# Patient Record
Sex: MALE | Race: WHITE | NOT HISPANIC OR LATINO | Employment: FULL TIME | URBAN - NONMETROPOLITAN AREA
[De-identification: names, ages, dates, MRNs, and addresses within clinical notes are randomized per-mention and may not be internally consistent; named-entity substitution may affect disease eponyms.]

---

## 2023-07-10 PROBLEM — N62 GYNECOMASTIA, MALE: Status: ACTIVE | Noted: 2023-07-10

## 2023-07-10 PROBLEM — N64.52 NIPPLE DISCHARGE IN MALE: Status: ACTIVE | Noted: 2023-07-10

## 2023-07-10 PROBLEM — E66.9 OBESITY (BMI 35.0-39.9 WITHOUT COMORBIDITY): Status: ACTIVE | Noted: 2023-07-10

## 2023-07-11 ENCOUNTER — OFFICE VISIT (OUTPATIENT)
Dept: PRIMARY CARE | Facility: CLINIC | Age: 28
End: 2023-07-11
Payer: COMMERCIAL

## 2023-07-11 VITALS
HEIGHT: 72 IN | SYSTOLIC BLOOD PRESSURE: 120 MMHG | OXYGEN SATURATION: 98 % | BODY MASS INDEX: 37.22 KG/M2 | DIASTOLIC BLOOD PRESSURE: 80 MMHG | WEIGHT: 274.8 LBS | HEART RATE: 101 BPM

## 2023-07-11 DIAGNOSIS — J30.2 SEASONAL ALLERGIC RHINITIS, UNSPECIFIED TRIGGER: Primary | ICD-10-CM

## 2023-07-11 DIAGNOSIS — J01.00 ACUTE NON-RECURRENT MAXILLARY SINUSITIS: ICD-10-CM

## 2023-07-11 PROCEDURE — 1036F TOBACCO NON-USER: CPT | Performed by: FAMILY MEDICINE

## 2023-07-11 PROCEDURE — 99213 OFFICE O/P EST LOW 20 MIN: CPT | Performed by: FAMILY MEDICINE

## 2023-07-11 RX ORDER — FLUTICASONE PROPIONATE 50 MCG
2 SPRAY, SUSPENSION (ML) NASAL DAILY
Qty: 16 G | Refills: 11 | Status: SHIPPED | OUTPATIENT
Start: 2023-07-11 | End: 2024-07-10

## 2023-07-11 RX ORDER — PREDNISONE 20 MG/1
40 TABLET ORAL DAILY
Qty: 10 TABLET | Refills: 0 | Status: SHIPPED | OUTPATIENT
Start: 2023-07-11 | End: 2023-07-16

## 2023-07-11 RX ORDER — AZITHROMYCIN 250 MG/1
TABLET, FILM COATED ORAL
Qty: 6 TABLET | Refills: 0 | Status: SHIPPED | OUTPATIENT
Start: 2023-07-11 | End: 2023-07-16

## 2023-07-11 RX ORDER — LORATADINE 10 MG/1
10 TABLET ORAL DAILY
Qty: 30 TABLET | Refills: 11 | Status: SHIPPED | OUTPATIENT
Start: 2023-07-11 | End: 2024-07-10

## 2023-07-11 ASSESSMENT — ENCOUNTER SYMPTOMS
CONSTIPATION: 0
RHINORRHEA: 1
CHEST TIGHTNESS: 0
FEVER: 0
FATIGUE: 0
SHORTNESS OF BREATH: 0
CHILLS: 0
FACIAL SWELLING: 0
VOMITING: 0
SINUS PAIN: 1
ABDOMINAL PAIN: 0
SINUS PRESSURE: 1
ACTIVITY CHANGE: 0
COUGH: 0
APPETITE CHANGE: 0
SORE THROAT: 0
DIARRHEA: 0
NAUSEA: 0
PALPITATIONS: 0

## 2023-07-11 NOTE — PROGRESS NOTES
Subjective   Patient ID: Aldair Case is a 27 y.o. male who presents for Dizziness.    HPI   2 weeks comes and goes, some tenderness under ears, + nasal congestion and stuffiness, PND, has had treatment in December, + vertigo (worse with movement), no ear pressure or pain, no hearing issues  No OTC    Review of Systems   Constitutional:  Negative for activity change, appetite change, chills, fatigue and fever.   HENT:  Positive for congestion, postnasal drip, rhinorrhea, sinus pressure, sinus pain and sneezing. Negative for ear discharge, ear pain, facial swelling, hearing loss, nosebleeds and sore throat.    Respiratory:  Negative for cough, chest tightness and shortness of breath.    Cardiovascular:  Negative for chest pain, palpitations and leg swelling.   Gastrointestinal:  Negative for abdominal pain, constipation, diarrhea, nausea and vomiting.       Objective   /80   Pulse 101   Ht 1.829 m (6')   Wt 125 kg (274 lb 12.8 oz)   SpO2 98%   BMI 37.27 kg/m²     Physical Exam  Vitals and nursing note reviewed.   Constitutional:       Appearance: Normal appearance.   HENT:      Head: Normocephalic and atraumatic.      Right Ear: Tympanic membrane, ear canal and external ear normal.      Left Ear: Tympanic membrane, ear canal and external ear normal.      Nose: Congestion and rhinorrhea present.      Mouth/Throat:      Mouth: Mucous membranes are moist.      Pharynx: Oropharynx is clear.   Cardiovascular:      Rate and Rhythm: Normal rate and regular rhythm.      Pulses: Normal pulses.      Heart sounds: Normal heart sounds.   Pulmonary:      Effort: Pulmonary effort is normal.      Breath sounds: Normal breath sounds.   Neurological:      Mental Status: He is alert.   Psychiatric:         Mood and Affect: Mood normal.         Behavior: Behavior normal.         Assessment/Plan   Problem List Items Addressed This Visit    None  Visit Diagnoses       Seasonal allergic rhinitis, unspecified trigger    -   Primary    Prednisone 40 mg a day for 5 days, loratadine 10 mg once a day and fluticasone nose spray at night.    Relevant Medications    predniSONE (Deltasone) 20 mg tablet    azithromycin (Zithromax) 250 mg tablet    loratadine (Claritin) 10 mg tablet    fluticasone (Flonase) 50 mcg/actuation nasal spray    Acute non-recurrent maxillary sinusitis        Treat allergies, prescribed Zithromax for the next 5 days.    Relevant Medications    predniSONE (Deltasone) 20 mg tablet    azithromycin (Zithromax) 250 mg tablet    loratadine (Claritin) 10 mg tablet    fluticasone (Flonase) 50 mcg/actuation nasal spray

## 2024-02-23 ENCOUNTER — OFFICE VISIT (OUTPATIENT)
Dept: PRIMARY CARE | Facility: CLINIC | Age: 29
End: 2024-02-23
Payer: COMMERCIAL

## 2024-02-23 VITALS
HEIGHT: 72 IN | DIASTOLIC BLOOD PRESSURE: 100 MMHG | OXYGEN SATURATION: 96 % | HEART RATE: 114 BPM | WEIGHT: 273.8 LBS | BODY MASS INDEX: 37.08 KG/M2 | SYSTOLIC BLOOD PRESSURE: 140 MMHG

## 2024-02-23 DIAGNOSIS — K42.9 UMBILICAL HERNIA WITHOUT OBSTRUCTION AND WITHOUT GANGRENE: Primary | ICD-10-CM

## 2024-02-23 PROCEDURE — 99213 OFFICE O/P EST LOW 20 MIN: CPT | Performed by: FAMILY MEDICINE

## 2024-02-23 PROCEDURE — 1036F TOBACCO NON-USER: CPT | Performed by: FAMILY MEDICINE

## 2024-02-23 ASSESSMENT — ENCOUNTER SYMPTOMS
FATIGUE: 0
ABDOMINAL PAIN: 1
BELCHING: 0
ARTHRALGIAS: 0
CHEST TIGHTNESS: 0
FEVER: 0
ACTIVITY CHANGE: 0
DYSURIA: 0
PALPITATIONS: 0
WEIGHT LOSS: 0
HEMATURIA: 0
COUGH: 0
APPETITE CHANGE: 0
VOMITING: 0
DIARRHEA: 0
FLATUS: 0
HEMATOCHEZIA: 0
NAUSEA: 0
MYALGIAS: 0
SHORTNESS OF BREATH: 0
HEADACHES: 0
FREQUENCY: 0
CONSTIPATION: 0
ANOREXIA: 0

## 2024-02-23 NOTE — PROGRESS NOTES
Subjective   Patient ID: Aldair Case is a 28 y.o. male who presents for Possible ABD Hernia.    Abdominal Pain  This is a recurrent problem. The current episode started more than 1 year ago. The onset quality is undetermined. The problem occurs intermittently. The most recent episode lasted 1 days. The problem has been unchanged. The pain is located in the periumbilical region. The pain is at a severity of 1/10. The quality of the pain is dull. The abdominal pain radiates to the periumbilical region. Pertinent negatives include no anorexia, arthralgias, belching, constipation, diarrhea, dysuria, fever, flatus, frequency, headaches, hematochezia, hematuria, melena, myalgias, nausea, vomiting or weight loss. The pain is aggravated by movement. The pain is relieved by Being still.      Tenderness for 3 years around umbilicus, pulling sensation, no n/v, no bloating or toilet issues    Review of Systems   Constitutional:  Negative for activity change, appetite change, fatigue, fever and weight loss.   Respiratory:  Negative for cough, chest tightness and shortness of breath.    Cardiovascular:  Negative for chest pain, palpitations and leg swelling.   Gastrointestinal:  Positive for abdominal pain. Negative for anorexia, constipation, diarrhea, flatus, hematochezia, melena, nausea and vomiting.   Genitourinary:  Negative for dysuria, frequency and hematuria.   Musculoskeletal:  Negative for arthralgias and myalgias.   Neurological:  Negative for headaches.       Objective   BP (!) 140/100   Pulse (!) 114   Ht 1.829 m (6')   Wt 124 kg (273 lb 12.8 oz)   SpO2 96%   BMI 37.13 kg/m²     Physical Exam  Vitals and nursing note reviewed.   Constitutional:       Appearance: Normal appearance. He is normal weight.   Abdominal:      General: Abdomen is flat. Bowel sounds are normal. There is no distension.      Palpations: Abdomen is soft. There is no mass.      Tenderness: There is abdominal tenderness. There is no  guarding.      Hernia: A hernia is present.      Comments: Small reducible very tender umbilical hernia palpated.   Neurological:      Mental Status: He is alert.         Assessment/Plan   Problem List Items Addressed This Visit    None  Visit Diagnoses         Codes    Umbilical hernia without obstruction and without gangrene    -  Primary K42.9    Refer to general surgery for evaluation.     Relevant Orders    Referral to General Surgery

## 2024-03-04 ENCOUNTER — PREP FOR PROCEDURE (OUTPATIENT)
Dept: SURGERY | Facility: CLINIC | Age: 29
End: 2024-03-04

## 2024-03-04 ENCOUNTER — OFFICE VISIT (OUTPATIENT)
Dept: SURGERY | Facility: CLINIC | Age: 29
End: 2024-03-04
Payer: COMMERCIAL

## 2024-03-04 VITALS
HEART RATE: 88 BPM | DIASTOLIC BLOOD PRESSURE: 92 MMHG | WEIGHT: 280 LBS | HEIGHT: 73 IN | BODY MASS INDEX: 37.11 KG/M2 | SYSTOLIC BLOOD PRESSURE: 138 MMHG

## 2024-03-04 DIAGNOSIS — K42.9 UMBILICAL HERNIA WITHOUT OBSTRUCTION AND WITHOUT GANGRENE: ICD-10-CM

## 2024-03-04 DIAGNOSIS — K42.9 UMBILICAL HERNIA WITHOUT OBSTRUCTION AND WITHOUT GANGRENE: Primary | ICD-10-CM

## 2024-03-04 PROCEDURE — 99203 OFFICE O/P NEW LOW 30 MIN: CPT | Performed by: SURGERY

## 2024-03-04 PROCEDURE — 1036F TOBACCO NON-USER: CPT | Performed by: SURGERY

## 2024-03-04 NOTE — PROGRESS NOTES
General Surgery Consultation    Patient: Aldair Case  : 1995  MRN: [id number]  Date of Consultation: 24    Referring Primary Care Provider: Rogers Ernandez MD    Chief Complaint: Discomfort at umbilicus    History of Present Illness: Aldair Case is a 28 y.o. old male seen at the request of Dr. Ernandez for evaluation of an umbilical hernia.  About 2 years ago he was working on a deck when he had acute onset of pain at the umbilicus with some vomiting.  He described it like a pulling sensation.  That acute episode resolved.  He has a small bulge at the umbilicus.  Over the past 6 months, anytime he tenses his abdominal muscles he gets a pulling sensation that is uncomfortable for him.  He denies any subsequent obstructive symptoms.  He has no previous abdominal surgery.  He is not a smoker.  He is not diabetic.  His BMI is 36.7.    Medical History:  Eczema  Obesity, BMI 36.7    Surgical History:  No previous abdominal surgery  Surgical removal of a birthmark    Home Medications:  Prior to Admission medications    Medication Sig Start Date End Date Taking? Authorizing Provider   fluticasone (Flonase) 50 mcg/actuation nasal spray Administer 2 sprays into each nostril once daily. Shake gently. Before first use, prime pump. After use, clean tip and replace cap. 7/11/23 7/10/24  Rogers Ernandez MD   loratadine (Claritin) 10 mg tablet Take 1 tablet (10 mg) by mouth once daily. 7/11/23 7/10/24  Rogers Ernandez MD       Allergies:  is allergic to cefaclor and penicillin.    Family History:   Mother with hypothyroidism.    Father with diabetes, hypertension, diabetes.  Brother with Crohn's disease.  Paternal grandfather with colon cancer.    Social History:  .  Non-smoker.  No alcohol or drug use.    ROS:  Constitutional:  no fever, sweats, and chills  Cardiovascular: No chest pain  Respiratory: No cough or shortness of breath  Gastrointestinal: + Small bulge at umbilicus, discomfort in umbilicus  "when tensing abdominal muscles.  No change in bowel habits or blood in the stool.  Genitourinary: no dysuria  Musculoskeletal: no weakness or swelling  Integumentary: no rashes  Neurological: no confusion  Endocrine: no heat or cold intolerance  Heme/Lymph: no easy bruising or bleeding    Objective:  BP (!) 138/92   Pulse 88   Ht 1.86 m (6' 1.23\")   Wt 127 kg (280 lb)   BMI 36.71 kg/m²     Physical Exam:  Constitutional: No acute distress, conversant, pleasant  Neurologic: alert and oriented  Psych: appropriate affect  Ears, Nose, Mouth and Throat: mucus membranes moist  Pulmonary: No labored breathing  Cardiovascular: Regular rate and rhythm  Abdomen: soft, non-distended, BMI 36.7, no surgical scars, small reducible 1 cm umbilical hernia, tender on reduction  Musculoskeletal: Moves all extremities, no edema  Skin: no jaundice    Labs/Imaging:   No pertinent labs or imaging available for review.    Assessment and Plan: Aldair Case is a 28 y.o. old male with a small reducible umbilical hernia.  Risks, benefits and alternatives of open umbilical hernia repair were discussed with the patient.  This included risk of bleeding, infection, viscous injury, and hernia recurrence. This will not require the use of mesh. The patient was agreeable to surgery and is scheduled for open repair of umbilical hernia under general anesthesia on 3/27/24.    Reyna Morris MD  3/4/2024    "

## 2024-03-04 NOTE — H&P (VIEW-ONLY)
General Surgery Consultation    Patient: Aldair Case  : 1995  MRN: [id number]  Date of Consultation: 24    Referring Primary Care Provider: Rogers Ernandez MD    Chief Complaint: Discomfort at umbilicus    History of Present Illness: Aldair Case is a 28 y.o. old male seen at the request of Dr. Ernandez for evaluation of an umbilical hernia.  About 2 years ago he was working on a deck when he had acute onset of pain at the umbilicus with some vomiting.  He described it like a pulling sensation.  That acute episode resolved.  He has a small bulge at the umbilicus.  Over the past 6 months, anytime he tenses his abdominal muscles he gets a pulling sensation that is uncomfortable for him.  He denies any subsequent obstructive symptoms.  He has no previous abdominal surgery.  He is not a smoker.  He is not diabetic.  His BMI is 36.7.    Medical History:  Eczema  Obesity, BMI 36.7    Surgical History:  No previous abdominal surgery  Surgical removal of a birthmark    Home Medications:  Prior to Admission medications    Medication Sig Start Date End Date Taking? Authorizing Provider   fluticasone (Flonase) 50 mcg/actuation nasal spray Administer 2 sprays into each nostril once daily. Shake gently. Before first use, prime pump. After use, clean tip and replace cap. 7/11/23 7/10/24  Rogers Ernandez MD   loratadine (Claritin) 10 mg tablet Take 1 tablet (10 mg) by mouth once daily. 7/11/23 7/10/24  Rogers Ernandez MD       Allergies:  is allergic to cefaclor and penicillin.    Family History:   Mother with hypothyroidism.    Father with diabetes, hypertension, diabetes.  Brother with Crohn's disease.  Paternal grandfather with colon cancer.    Social History:  .  Non-smoker.  No alcohol or drug use.    ROS:  Constitutional:  no fever, sweats, and chills  Cardiovascular: No chest pain  Respiratory: No cough or shortness of breath  Gastrointestinal: + Small bulge at umbilicus, discomfort in umbilicus  "when tensing abdominal muscles.  No change in bowel habits or blood in the stool.  Genitourinary: no dysuria  Musculoskeletal: no weakness or swelling  Integumentary: no rashes  Neurological: no confusion  Endocrine: no heat or cold intolerance  Heme/Lymph: no easy bruising or bleeding    Objective:  BP (!) 138/92   Pulse 88   Ht 1.86 m (6' 1.23\")   Wt 127 kg (280 lb)   BMI 36.71 kg/m²     Physical Exam:  Constitutional: No acute distress, conversant, pleasant  Neurologic: alert and oriented  Psych: appropriate affect  Ears, Nose, Mouth and Throat: mucus membranes moist  Pulmonary: No labored breathing  Cardiovascular: Regular rate and rhythm  Abdomen: soft, non-distended, BMI 36.7, no surgical scars, small reducible 1 cm umbilical hernia, tender on reduction  Musculoskeletal: Moves all extremities, no edema  Skin: no jaundice    Labs/Imaging:   No pertinent labs or imaging available for review.    Assessment and Plan: Aldair Case is a 28 y.o. old male with a small reducible umbilical hernia.  Risks, benefits and alternatives of open umbilical hernia repair were discussed with the patient.  This included risk of bleeding, infection, viscous injury, and hernia recurrence. This will not require the use of mesh. The patient was agreeable to surgery and is scheduled for open repair of umbilical hernia under general anesthesia on 3/27/24.    Reyna Morris MD  3/4/2024    "

## 2024-03-04 NOTE — LETTER
2024     Rogers Ernandez MD  [address] [facility] [address]    Patient: Aldair Case   YOB: 1995   Date of Visit: 3/4/2024       Dear Dr. Rogers Ernandez MD:    Thank you for referring Aldair Case to me for evaluation. Below are my notes for this consultation.  If you have questions, please do not hesitate to call me. I look forward to following your patient along with you.       Sincerely,     Reyna Morris MD      CC: No Recipients  ______________________________________________________________________________________    General Surgery Consultation    Patient: Aldair Case  : 1995  MRN: [id number]  Date of Consultation: 24    Referring Primary Care Provider: Rogers Ernandez MD    Chief Complaint: Discomfort at umbilicus    History of Present Illness: Aldair Case is a 28 y.o. old male seen at the request of Dr. Ernandez for evaluation of an umbilical hernia.  About 2 years ago he was working on a deck when he had acute onset of pain at the umbilicus with some vomiting.  He described it like a pulling sensation.  That acute episode resolved.  He has a small bulge at the umbilicus.  Over the past 6 months, anytime he tenses his abdominal muscles he gets a pulling sensation that is uncomfortable for him.  He denies any subsequent obstructive symptoms.  He has no previous abdominal surgery.  He is not a smoker.  He is not diabetic.  His BMI is 36.7.    Medical History:  Eczema  Obesity, BMI 36.7    Surgical History:  No previous abdominal surgery  Surgical removal of a birthmark    Home Medications:  Prior to Admission medications    Medication Sig Start Date End Date Taking? Authorizing Provider   fluticasone (Flonase) 50 mcg/actuation nasal spray Administer 2 sprays into each nostril once daily. Shake gently. Before first use, prime pump. After use, clean tip and replace cap. 7/11/23 7/10/24  Rogers Ernandez MD   loratadine (Claritin) 10 mg tablet Take 1 tablet (10  "mg) by mouth once daily. 7/11/23 7/10/24  Rogers Ernandez MD       Allergies:  is allergic to cefaclor and penicillin.    Family History:   Mother with hypothyroidism.    Father with diabetes, hypertension, diabetes.  Brother with Crohn's disease.  Paternal grandfather with colon cancer.    Social History:  .  Non-smoker.  No alcohol or drug use.    ROS:  Constitutional:  no fever, sweats, and chills  Cardiovascular: No chest pain  Respiratory: No cough or shortness of breath  Gastrointestinal: + Small bulge at umbilicus, discomfort in umbilicus when tensing abdominal muscles.  No change in bowel habits or blood in the stool.  Genitourinary: no dysuria  Musculoskeletal: no weakness or swelling  Integumentary: no rashes  Neurological: no confusion  Endocrine: no heat or cold intolerance  Heme/Lymph: no easy bruising or bleeding    Objective:  BP (!) 138/92   Pulse 88   Ht 1.86 m (6' 1.23\")   Wt 127 kg (280 lb)   BMI 36.71 kg/m²     Physical Exam:  Constitutional: No acute distress, conversant, pleasant  Neurologic: alert and oriented  Psych: appropriate affect  Ears, Nose, Mouth and Throat: mucus membranes moist  Pulmonary: No labored breathing  Cardiovascular: Regular rate and rhythm  Abdomen: soft, non-distended, BMI 36.7, no surgical scars, small reducible 1 cm umbilical hernia, tender on reduction  Musculoskeletal: Moves all extremities, no edema  Skin: no jaundice    Labs/Imaging:   No pertinent labs or imaging available for review.    Assessment and Plan: Aldair Case is a 28 y.o. old male with a small reducible umbilical hernia.  Risks, benefits and alternatives of open umbilical hernia repair were discussed with the patient.  This included risk of bleeding, infection, viscous injury, and hernia recurrence. This will not require the use of mesh. The patient was agreeable to surgery and is scheduled for open repair of umbilical hernia under general anesthesia on 3/27/24.    Reyna Morris" MD  3/4/2024

## 2024-03-05 ENCOUNTER — DOCUMENTATION (OUTPATIENT)
Dept: SURGERY | Facility: CLINIC | Age: 29
End: 2024-03-05
Payer: COMMERCIAL

## 2024-03-05 NOTE — PROGRESS NOTES
Notified patient of  [facility] scheduled on  3-27-24   .Instructions reviewed. Advised patient [facility] will contact them 24 hours before surgery with arrival time. Pt voices understanding. Anabel Wolfe MA.

## 2024-03-22 NOTE — PREPROCEDURE INSTRUCTIONS
No outpatient medications have been marked as taking for the 3/27/24 encounter (Hospital Encounter).                       NPO Instructions:    Nothing to eat or drink after midnight    Additional Instructions:     Will need  home, will receive call day before surgery with arrival time

## 2024-03-26 ENCOUNTER — ANESTHESIA EVENT (OUTPATIENT)
Dept: OPERATING ROOM | Facility: HOSPITAL | Age: 29
End: 2024-03-26
Payer: COMMERCIAL

## 2024-03-27 ENCOUNTER — HOSPITAL ENCOUNTER (OUTPATIENT)
Facility: HOSPITAL | Age: 29
Setting detail: OUTPATIENT SURGERY
Discharge: HOME | End: 2024-03-27
Attending: SURGERY | Admitting: SURGERY
Payer: COMMERCIAL

## 2024-03-27 ENCOUNTER — ANESTHESIA (OUTPATIENT)
Dept: OPERATING ROOM | Facility: HOSPITAL | Age: 29
End: 2024-03-27
Payer: COMMERCIAL

## 2024-03-27 VITALS
SYSTOLIC BLOOD PRESSURE: 118 MMHG | WEIGHT: 278.44 LBS | BODY MASS INDEX: 36.51 KG/M2 | RESPIRATION RATE: 20 BRPM | OXYGEN SATURATION: 96 % | TEMPERATURE: 97.4 F | DIASTOLIC BLOOD PRESSURE: 76 MMHG | HEART RATE: 76 BPM

## 2024-03-27 DIAGNOSIS — K42.9 UMBILICAL HERNIA WITHOUT OBSTRUCTION AND WITHOUT GANGRENE: Primary | ICD-10-CM

## 2024-03-27 DIAGNOSIS — G89.18 POST-OPERATIVE PAIN: ICD-10-CM

## 2024-03-27 PROCEDURE — 49591 RPR AA HRN 1ST < 3 CM RDC: CPT | Performed by: SURGERY

## 2024-03-27 PROCEDURE — 3600000003 HC OR TIME - INITIAL BASE CHARGE - PROCEDURE LEVEL THREE: Performed by: SURGERY

## 2024-03-27 PROCEDURE — 2500000004 HC RX 250 GENERAL PHARMACY W/ HCPCS (ALT 636 FOR OP/ED): Performed by: SURGERY

## 2024-03-27 PROCEDURE — 7100000002 HC RECOVERY ROOM TIME - EACH INCREMENTAL 1 MINUTE: Performed by: SURGERY

## 2024-03-27 PROCEDURE — 3700000001 HC GENERAL ANESTHESIA TIME - INITIAL BASE CHARGE: Performed by: SURGERY

## 2024-03-27 PROCEDURE — 7100000001 HC RECOVERY ROOM TIME - INITIAL BASE CHARGE: Performed by: SURGERY

## 2024-03-27 PROCEDURE — 7100000010 HC PHASE TWO TIME - EACH INCREMENTAL 1 MINUTE: Performed by: SURGERY

## 2024-03-27 PROCEDURE — 3600000008 HC OR TIME - EACH INCREMENTAL 1 MINUTE - PROCEDURE LEVEL THREE: Performed by: SURGERY

## 2024-03-27 PROCEDURE — 2500000005 HC RX 250 GENERAL PHARMACY W/O HCPCS: Performed by: ANESTHESIOLOGY

## 2024-03-27 PROCEDURE — 2500000004 HC RX 250 GENERAL PHARMACY W/ HCPCS (ALT 636 FOR OP/ED): Performed by: ANESTHESIOLOGY

## 2024-03-27 PROCEDURE — 2720000007 HC OR 272 NO HCPCS: Performed by: SURGERY

## 2024-03-27 PROCEDURE — 7100000009 HC PHASE TWO TIME - INITIAL BASE CHARGE: Performed by: SURGERY

## 2024-03-27 PROCEDURE — 3700000002 HC GENERAL ANESTHESIA TIME - EACH INCREMENTAL 1 MINUTE: Performed by: SURGERY

## 2024-03-27 PROCEDURE — 2500000005 HC RX 250 GENERAL PHARMACY W/O HCPCS: Performed by: SURGERY

## 2024-03-27 RX ORDER — OXYCODONE HYDROCHLORIDE 5 MG/1
5 TABLET ORAL EVERY 4 HOURS PRN
Status: DISCONTINUED | OUTPATIENT
Start: 2024-03-27 | End: 2024-03-27 | Stop reason: HOSPADM

## 2024-03-27 RX ORDER — BUPIVACAINE HYDROCHLORIDE 2.5 MG/ML
INJECTION, SOLUTION INFILTRATION; PERINEURAL AS NEEDED
Status: DISCONTINUED | OUTPATIENT
Start: 2024-03-27 | End: 2024-03-27 | Stop reason: HOSPADM

## 2024-03-27 RX ORDER — FENTANYL CITRATE 50 UG/ML
INJECTION, SOLUTION INTRAMUSCULAR; INTRAVENOUS AS NEEDED
Status: DISCONTINUED | OUTPATIENT
Start: 2024-03-27 | End: 2024-03-27

## 2024-03-27 RX ORDER — SODIUM CHLORIDE, SODIUM LACTATE, POTASSIUM CHLORIDE, CALCIUM CHLORIDE 600; 310; 30; 20 MG/100ML; MG/100ML; MG/100ML; MG/100ML
75 INJECTION, SOLUTION INTRAVENOUS CONTINUOUS
Status: DISCONTINUED | OUTPATIENT
Start: 2024-03-27 | End: 2024-03-27 | Stop reason: HOSPADM

## 2024-03-27 RX ORDER — KETOROLAC TROMETHAMINE 30 MG/ML
INJECTION, SOLUTION INTRAMUSCULAR; INTRAVENOUS AS NEEDED
Status: DISCONTINUED | OUTPATIENT
Start: 2024-03-27 | End: 2024-03-27

## 2024-03-27 RX ORDER — ONDANSETRON HYDROCHLORIDE 2 MG/ML
4 INJECTION, SOLUTION INTRAVENOUS ONCE AS NEEDED
Status: DISCONTINUED | OUTPATIENT
Start: 2024-03-27 | End: 2024-03-27 | Stop reason: HOSPADM

## 2024-03-27 RX ORDER — LIDOCAINE HYDROCHLORIDE 10 MG/ML
INJECTION, SOLUTION EPIDURAL; INFILTRATION; INTRACAUDAL; PERINEURAL AS NEEDED
Status: DISCONTINUED | OUTPATIENT
Start: 2024-03-27 | End: 2024-03-27

## 2024-03-27 RX ORDER — ONDANSETRON HYDROCHLORIDE 2 MG/ML
4 INJECTION, SOLUTION INTRAVENOUS ONCE
Status: COMPLETED | OUTPATIENT
Start: 2024-03-27 | End: 2024-03-27

## 2024-03-27 RX ORDER — PROPOFOL 10 MG/ML
INJECTION, EMULSION INTRAVENOUS AS NEEDED
Status: DISCONTINUED | OUTPATIENT
Start: 2024-03-27 | End: 2024-03-27

## 2024-03-27 RX ORDER — DEXAMETHASONE SODIUM PHOSPHATE 10 MG/ML
10 INJECTION INTRAMUSCULAR; INTRAVENOUS ONCE
Status: COMPLETED | OUTPATIENT
Start: 2024-03-27 | End: 2024-03-27

## 2024-03-27 RX ORDER — MIDAZOLAM HYDROCHLORIDE 1 MG/ML
2 INJECTION INTRAMUSCULAR; INTRAVENOUS ONCE
Status: COMPLETED | OUTPATIENT
Start: 2024-03-27 | End: 2024-03-27

## 2024-03-27 RX ORDER — FAMOTIDINE 10 MG/ML
20 INJECTION INTRAVENOUS ONCE
Status: COMPLETED | OUTPATIENT
Start: 2024-03-27 | End: 2024-03-27

## 2024-03-27 RX ORDER — OXYCODONE AND ACETAMINOPHEN 5; 325 MG/1; MG/1
1 TABLET ORAL EVERY 6 HOURS PRN
Qty: 6 TABLET | Refills: 0 | Status: SHIPPED | OUTPATIENT
Start: 2024-03-27 | End: 2024-03-29

## 2024-03-27 RX ORDER — ACETAMINOPHEN 325 MG/1
975 TABLET ORAL ONCE
Status: COMPLETED | OUTPATIENT
Start: 2024-03-27 | End: 2024-03-27

## 2024-03-27 RX ORDER — VANCOMYCIN 2 GRAM/500 ML IN 0.9 % SODIUM CHLORIDE INTRAVENOUS
2000 ONCE
Status: COMPLETED | OUTPATIENT
Start: 2024-03-27 | End: 2024-03-27

## 2024-03-27 RX ORDER — SODIUM CHLORIDE, SODIUM LACTATE, POTASSIUM CHLORIDE, CALCIUM CHLORIDE 600; 310; 30; 20 MG/100ML; MG/100ML; MG/100ML; MG/100ML
100 INJECTION, SOLUTION INTRAVENOUS CONTINUOUS
Status: DISCONTINUED | OUTPATIENT
Start: 2024-03-27 | End: 2024-03-27 | Stop reason: HOSPADM

## 2024-03-27 RX ADMIN — PROPOFOL 200 MG: 10 INJECTION, EMULSION INTRAVENOUS at 12:23

## 2024-03-27 RX ADMIN — ACETAMINOPHEN 975 MG: 325 TABLET ORAL at 11:40

## 2024-03-27 RX ADMIN — FAMOTIDINE 20 MG: 10 INJECTION, SOLUTION INTRAVENOUS at 11:40

## 2024-03-27 RX ADMIN — FENTANYL CITRATE 50 MCG: 50 INJECTION, SOLUTION INTRAMUSCULAR; INTRAVENOUS at 12:43

## 2024-03-27 RX ADMIN — HYDROMORPHONE HYDROCHLORIDE 0.5 MG: 1 INJECTION, SOLUTION INTRAMUSCULAR; INTRAVENOUS; SUBCUTANEOUS at 13:44

## 2024-03-27 RX ADMIN — KETOROLAC TROMETHAMINE 30 MG: 30 INJECTION, SOLUTION INTRAMUSCULAR; INTRAVENOUS at 12:36

## 2024-03-27 RX ADMIN — Medication 20 MG: at 12:22

## 2024-03-27 RX ADMIN — MIDAZOLAM HYDROCHLORIDE 2 MG: 1 INJECTION, SOLUTION INTRAMUSCULAR; INTRAVENOUS at 12:06

## 2024-03-27 RX ADMIN — Medication 2000 MG: at 11:41

## 2024-03-27 RX ADMIN — LIDOCAINE HYDROCHLORIDE 40 MG: 10 INJECTION, SOLUTION EPIDURAL; INFILTRATION; INTRACAUDAL; PERINEURAL at 12:21

## 2024-03-27 RX ADMIN — SODIUM CHLORIDE, POTASSIUM CHLORIDE, SODIUM LACTATE AND CALCIUM CHLORIDE 75 ML/HR: 600; 310; 30; 20 INJECTION, SOLUTION INTRAVENOUS at 11:40

## 2024-03-27 RX ADMIN — FENTANYL CITRATE 100 MCG: 50 INJECTION, SOLUTION INTRAMUSCULAR; INTRAVENOUS at 12:20

## 2024-03-27 RX ADMIN — ONDANSETRON 4 MG: 2 INJECTION INTRAMUSCULAR; INTRAVENOUS at 11:41

## 2024-03-27 RX ADMIN — DEXAMETHASONE SODIUM PHOSPHATE 10 MG: 10 INJECTION INTRAMUSCULAR; INTRAVENOUS at 11:40

## 2024-03-27 SDOH — HEALTH STABILITY: MENTAL HEALTH: CURRENT SMOKER: 0

## 2024-03-27 ASSESSMENT — PAIN SCALES - GENERAL
PAINLEVEL_OUTOF10: 2
PAINLEVEL_OUTOF10: 2
PAINLEVEL_OUTOF10: 0 - NO PAIN
PAINLEVEL_OUTOF10: 2
PAINLEVEL_OUTOF10: 4
PAINLEVEL_OUTOF10: 2
PAIN_LEVEL: 0

## 2024-03-27 ASSESSMENT — PAIN DESCRIPTION - LOCATION: LOCATION: ABDOMEN

## 2024-03-27 ASSESSMENT — COLUMBIA-SUICIDE SEVERITY RATING SCALE - C-SSRS
1. IN THE PAST MONTH, HAVE YOU WISHED YOU WERE DEAD OR WISHED YOU COULD GO TO SLEEP AND NOT WAKE UP?: NO
6. HAVE YOU EVER DONE ANYTHING, STARTED TO DO ANYTHING, OR PREPARED TO DO ANYTHING TO END YOUR LIFE?: NO
2. HAVE YOU ACTUALLY HAD ANY THOUGHTS OF KILLING YOURSELF?: NO

## 2024-03-27 ASSESSMENT — PAIN - FUNCTIONAL ASSESSMENT
PAIN_FUNCTIONAL_ASSESSMENT: 0-10
PAIN_FUNCTIONAL_ASSESSMENT: 0-10

## 2024-03-27 NOTE — OP NOTE
[facility]  Operative Report    Patient: Aldair Case  : 1995  MRN: [id number]    Date of Operation: 24    Pre-Operative Diagnosis: Umbilical hernia  Post-Operative Diagnosis: Umbilical hernia  Procedure: Open primary repair of umbilical hernia    Surgeon: Reyna Morris MD  Assistant: n/a    Anesthesia: General  Findings: Umbilical hernia, defect measuring 1 cm  EBL: 10 ml  Case Type: Clean  Disposition/Condition: PACU / Stable    Indication: Patient is a 28 y.o. male with an umbilical hernia. Risks and benefits of repair were discussed and the patient was agreeable to proceed with surgery.    Description: The patient was brought to the operating room and placed in supine position. Anesthesia was induced. The patient's abdomen was prepped and draped. Following injection of local anesthetic, a curvilinear infraumbilical incision was made and subcutaneous tissues were divided down to the umbilical raphe. The umbilical raphe was circumferentially dissected.  The hernia sac was incised. It contained fat, and this was reduced. A small portion of the hernia sac was resected. The fascial edges were cleaned. The defect measured 1 cm. This was closed transversely with interrupted figure-of-eight 0 Prolene. This closed nicely without tension. Local anesthetic was injected. 3-0 vicryl was used to tack the umbilical raphe to the fascia. Skin was closed with 3-0 Vicryl deep dermal sutures, followed by a running subcuticular 4-0 Vicryl. Steri strips were placed followed by a compressive cotton ball, gauze and tegaderm dressing. The patient tolerated the procedure well and was transferred to PACU in stable condition.    Reyna Morris MD  3/27/2024

## 2024-03-27 NOTE — ANESTHESIA POSTPROCEDURE EVALUATION
Patient: Aldair Case    Procedure Summary       Date: 03/27/24 Room / Location: [facility] OR 01 / [facility] OR    Anesthesia Start: 1214 Anesthesia Stop: 1326    Procedure: Umbilical hernia repair (Abdomen) Diagnosis:       Umbilical hernia without obstruction and without gangrene      (Umbilical hernia without obstruction and without gangrene [K42.9])    Surgeons: Reyna Morris MD Responsible Provider: Lance Chiu DO    Anesthesia Type: general ASA Status: 1            Anesthesia Type: general    Vitals Value Taken Time   /77 03/27/24 1329   Temp 97.0 03/27/24 1329   Pulse 96 03/27/24 1328   Resp 16 03/27/24 1328   SpO2 95 % 03/27/24 1328   Vitals shown include unvalidated device data.    Anesthesia Post Evaluation    Patient location during evaluation: bedside  Patient participation: complete - patient participated  Level of consciousness: awake  Pain score: 0  Pain management: adequate  Multimodal analgesia pain management approach  Airway patency: patent  Cardiovascular status: acceptable  Respiratory status: acceptable  Hydration status: acceptable  Postoperative Nausea and Vomiting: none  Comments: Easily awakens.  VSS.  Denies pain or nausea.      Awake and comfortable.  VSS.  Thankfuol for care provided.  No notable events documented.

## 2024-03-27 NOTE — ANESTHESIA PROCEDURE NOTES
Airway  Date/Time: 3/27/2024 12:26 PM  Urgency: elective    Airway not difficult    Staffing  Performed: attending   Authorized by: Lance Chiu DO    Performed by: Lance Chiu DO  Patient location during procedure: OR    Indications and Patient Condition  Indications for airway management: anesthesia  Spontaneous Ventilation: absent  Sedation level: deep  Preoxygenated: yes  Patient position: sniffing  Mask difficulty assessment: 1 - vent by mask    Final Airway Details  Final airway type: supraglottic airway      Successful airway: Supreme  Size 5     Number of attempts at approach: 1    Additional Comments  Pre-O2.  Monitors.  Smooth IV induction.  Easy mask Vent with Oral Airway.  Easy atraumatic placement of LMA. Clear airway.

## 2024-03-27 NOTE — ANESTHESIA PREPROCEDURE EVALUATION
Patient: Aldair Case    Procedure Information       Anesthesia Start Date/Time: 03/27/24 1214    Procedure: Umbilical hernia repair (Abdomen)    Location: [facility] OR 01 / [facility] OR    Surgeons: Reyna Morris MD            Relevant Problems   Anesthesia   (-) Malignant hyperthermia   (-) PONV (postoperative nausea and vomiting)      Cardiac (within normal limits)      Neuro (within normal limits)      GI (within normal limits)      Liver (within normal limits)      Musculoskeletal (within normal limits)      HEENT (within normal limits)     HPI  umbilical hernia.  Clinical information reviewed:   Tobacco  Allergies  Meds   Med Hx  Surg Hx   Fam Hx  Soc Hx        NPO Detail:  NPO/Void Status  Carbohydrate Drink Given Prior to Surgery? : N  Date of Last Liquid: 03/26/24  Time of Last Liquid: 1800  Date of Last Solid: 03/26/24  Time of Last Solid: 1800  Last Intake Type: Clear fluids  Time of Last Void: 1000         Physical Exam    Airway  Mallampati: II  TM distance: >3 FB  Neck ROM: full     Cardiovascular   Rhythm: regular  Rate: normal     Dental   Comments: Intact   Pulmonary   Breath sounds clear to auscultation     Abdominal            Anesthesia Plan    History of general anesthesia?: yes  History of complications of general anesthesia?: no    ASA 1     general     The patient is not a current smoker.    intravenous induction   Anesthetic plan and risks discussed with patient.    GA  discussed with Patient.  Plan and process discussed for anesthesia for procedure.  Risks and benefits discussed.  All questions answered with patient.  The patient understands plan and wishes to proceed.

## 2024-03-28 ENCOUNTER — TELEPHONE (OUTPATIENT)
Dept: SURGERY | Facility: CLINIC | Age: 29
End: 2024-03-28
Payer: COMMERCIAL

## 2024-03-28 NOTE — TELEPHONE ENCOUNTER
Spoke to patient after surgery. Pt denies fever, chills, nausea, and vomiting. Pt had no questions at this time.  Provided office number to patient for any questions. Pt will follow up on 4-25-25 @ 3:30pm.

## 2024-04-25 ENCOUNTER — OFFICE VISIT (OUTPATIENT)
Dept: SURGERY | Facility: CLINIC | Age: 29
End: 2024-04-25
Payer: COMMERCIAL

## 2024-04-25 VITALS
SYSTOLIC BLOOD PRESSURE: 126 MMHG | BODY MASS INDEX: 36.84 KG/M2 | DIASTOLIC BLOOD PRESSURE: 74 MMHG | HEART RATE: 76 BPM | WEIGHT: 278 LBS | HEIGHT: 73 IN

## 2024-04-25 DIAGNOSIS — Z48.89 POSTOPERATIVE VISIT: Primary | ICD-10-CM

## 2024-04-25 PROCEDURE — 99024 POSTOP FOLLOW-UP VISIT: CPT | Performed by: SURGERY

## 2024-04-25 PROCEDURE — 1036F TOBACCO NON-USER: CPT | Performed by: SURGERY

## 2024-04-25 NOTE — PROGRESS NOTES
"General Surgery Post-Operative Visit    Patient: Aldair Case  : 1995  MRN: [id number]  Date of Visit: 24    Chief Complaint: s/p umbilical hernia repair    History of Present Illness: Aldair Case is a 28 y.o. old male s/p open primary umbilical hernia repair on 3/27/24.  Prior to surgery, he was having a pulling sensation that was giving him discomfort at the umbilicus.  He had a small, 1 cm, hernia defect.  This was repaired in an open primary fashion.  Since surgery, the pulling sensation has completely resolved.  Reports that when he first got home from the hospital, he did have to change his dressing twice because there was some bleeding at the incision.  However, that resolved after he laid down.  He has no bruising.  He has no redness or drainage from the incision.  Overall, he is satisfied with his result.    Home Medications:  Prior to Admission medications    Medication Sig Start Date End Date Taking? Authorizing Provider   fluticasone (Flonase) 50 mcg/actuation nasal spray Administer 2 sprays into each nostril once daily. Shake gently. Before first use, prime pump. After use, clean tip and replace cap.  Patient not taking: Reported on 3/27/2024 7/11/23 7/10/24  Rogers Ernandez MD   loratadine (Claritin) 10 mg tablet Take 1 tablet (10 mg) by mouth once daily.  Patient not taking: Reported on 3/27/2024 7/11/23 7/10/24  Rogers Ernandez MD     Allergies:  is allergic to cefaclor and penicillin.    ROS:  No fever or redness at incision    Objective:  /74   Pulse 76   Ht 1.86 m (6' 1.23\")   Wt 126 kg (278 lb)   BMI 36.45 kg/m²     Physical Exam:  No acute distress  No labored breathing  NSR  Abdomen soft, non-distended, incision clean/dry/intact, no hernia recurrence    Assessment and Plan: Aldair Case is a 28 y.o. old male s/p umbilical hernia repair.  He is doing well without any signs or symptoms of postoperative complication or hernia recurrence.  He may resume regular " activity.  He will follow-up as needed.    Reyna Morris MD  4/25/2024

## 2024-05-20 ENCOUNTER — OFFICE VISIT (OUTPATIENT)
Dept: URGENT CARE | Facility: CLINIC | Age: 29
End: 2024-05-20
Payer: COMMERCIAL

## 2024-05-20 VITALS
TEMPERATURE: 97.7 F | OXYGEN SATURATION: 98 % | RESPIRATION RATE: 12 BRPM | DIASTOLIC BLOOD PRESSURE: 92 MMHG | SYSTOLIC BLOOD PRESSURE: 132 MMHG | HEART RATE: 75 BPM

## 2024-05-20 DIAGNOSIS — H10.9 BACTERIAL CONJUNCTIVITIS OF RIGHT EYE: Primary | ICD-10-CM

## 2024-05-20 PROCEDURE — 99212 OFFICE O/P EST SF 10 MIN: CPT

## 2024-05-20 RX ORDER — OFLOXACIN 3 MG/ML
SOLUTION/ DROPS OPHTHALMIC
Qty: 5 ML | Refills: 0 | Status: SHIPPED | OUTPATIENT
Start: 2024-05-20

## 2024-05-20 ASSESSMENT — VISUAL ACUITY: OU: 1

## 2024-05-20 NOTE — PROGRESS NOTES
[facility] AMAN NOTE:      Name: Aldair Case, 28 y.o.    CSN:[id number]   MRN:[id number]    PCP: Rogers Ernandez MD    ALL:    Allergies   Allergen Reactions    Cefaclor Unknown    Penicillin Unknown       History:    Chief Complaint: r eye redness, itching (X this morning)    Encounter Date: 5/20/2024      HPI: The history was obtained from the patient. Aldair is a 28 y.o. male, who presents with a chief complaint of r eye redness, itching (X this morning).  Patient states he woke up this morning and did have discharge from his right eye.  States they have been itching as well.  He does state that his daughter currently has pinkeye.  He does wear contacts, currently wearing them in patient room.  He does endorse some congestion as well.  He denies fevers, headaches, nausea, vomiting, sore throat, chest pain, shortness of breath, abdominal pain.    PMHx:    Past Medical History:   Diagnosis Date    Eczema               Current Outpatient Medications   Medication Sig Dispense Refill    fluticasone (Flonase) 50 mcg/actuation nasal spray Administer 2 sprays into each nostril once daily. Shake gently. Before first use, prime pump. After use, clean tip and replace cap. 16 g 11    loratadine (Claritin) 10 mg tablet Take 1 tablet (10 mg) by mouth once daily. 30 tablet 11    ofloxacin (Ocuflox) 0.3 % ophthalmic solution Instill 1 to 2 drops in affected eye(s) every 4 hours for the first 2 days, then instill 1 to 2 drops 4 times daily for an additional 5 days. 5 mL 0     No current facility-administered medications for this visit.         PMSx:    Past Surgical History:   Procedure Laterality Date    FRACTURE SURGERY      OTHER SURGICAL HISTORY  02/01/2022    Arm surgery    UMBILICAL HERNIA REPAIR  03/27/2024    Open primary umbilical hernia repair by Dr. Arturo Byrne Hx:   Family History   Problem Relation Name Age of Onset    Hypothyroidism Mother      Diabetes type II Father Lance      Hypertension Father Lance     Diabetes Father Lance     Hernia Father Lance     Crohn's disease Brother Boogie     Heart disease Mother's Brother Imani     Stroke Mother's Brother Cully     Heart disease Mother's Brother Leonid     Heart disease Mother's Brother Beny     Hypertension Father's Brother Dieudonne     Hernia Father's Brother Dieudonne     Asthma Paternal Grandfather Antwon     Cancer Paternal Grandfather Antwon     Colon cancer Paternal Grandfather Antwon     Clotting disorder Paternal Grandfather Antwon        SOC. Hx:     Social History     Socioeconomic History    Marital status:      Spouse name: Talisha    Number of children: Not on file    Years of education: Not on file    Highest education level: Not on file   Occupational History    Not on file   Tobacco Use    Smoking status: Never     Passive exposure: Never    Smokeless tobacco: Never   Vaping Use    Vaping status: Never Used   Substance and Sexual Activity    Alcohol use: Never    Drug use: Never    Sexual activity: Yes     Partners: Female   Other Topics Concern    Not on file   Social History Narrative    Not on file     Social Determinants of Health     Financial Resource Strain: Not on file   Food Insecurity: Not on file   Transportation Needs: Not on file   Physical Activity: Not on file   Stress: Not on file   Social Connections: Not on file   Intimate Partner Violence: Not on file   Housing Stability: Not on file         Vitals:    05/20/24 1152   BP: (!) 132/92   Pulse: 75   Resp: 12   Temp: 36.5 °C (97.7 °F)   SpO2: 98%                Physical Exam  Vitals reviewed.   Constitutional:       Appearance: Normal appearance.   HENT:      Right Ear: Tympanic membrane normal.      Left Ear: Tympanic membrane normal.      Nose: Nose normal.      Mouth/Throat:      Mouth: Mucous membranes are moist.      Pharynx: Oropharynx is clear.   Eyes:      General: Lids are normal. Lids are everted, no foreign bodies appreciated. Vision grossly intact.       Conjunctiva/sclera:      Right eye: Right conjunctiva is injected. Exudate present.      Left eye: Left conjunctiva is not injected. No exudate.     Pupils: Pupils are equal, round, and reactive to light.   Cardiovascular:      Rate and Rhythm: Normal rate and regular rhythm.      Pulses: Normal pulses.      Heart sounds: Normal heart sounds.   Pulmonary:      Effort: Pulmonary effort is normal.      Breath sounds: Normal breath sounds.   Skin:     General: Skin is warm.   Neurological:      General: No focal deficit present.      Mental Status: He is alert and oriented to person, place, and time.   Psychiatric:         Mood and Affect: Mood normal.         Behavior: Behavior normal.       I did personally review Aldair's past medical history, surgical history, social history, as well as family history (when relevant).  In this case, I also oversaw the his drug management by reviewing his medication list, allergy list, as well as the medications that I prescribed during the UC course and/or recommended as an out-patient (including possible OTC medications such as acetaminophen, NSAIDs , etc).    After reviewing the items above, I did look at previous medical documentation, such as recent hospitalizations, office visits, and/or recent consultations with PCP/specialist.                          SDOH:   Another factor that I considered in Aldair's care was his Social Determinants of Health (SDOH). During this UC encounter, he did not have social determinants of health. Those SDOH influencing Aldair's care are: none    LABORATORY @ RADIOLOGICAL IMAGING (if done):     No results found for this or any previous visit (from the past 24 hour(s)).    UC COURSE/MEDICAL DECISION MAKING:    Aldair is a 28 y.o., who presents with a working diagnosis of   1. Bacterial conjunctivitis of right eye      Aldair was seen today for r eye redness, itching.  Diagnoses and all orders for this visit:  Bacterial conjunctivitis of right eye  "(Primary)  -     ofloxacin (Ocuflox) 0.3 % ophthalmic solution; Instill 1 to 2 drops in affected eye(s) every 4 hours for the first 2 days, then instill 1 to 2 drops 4 times daily for an additional 5 days.    I recommended patient use warm compresses to help relief symptoms and discharge discomfort.  He is to discontinue his current contact lenses and wear glasses until antibiotic completion.  He may resume with a different pair of contact lenses at that time.  I recommended he use the abx drops as directed and to follow up with our office or ER if symptoms worsen or fail to improve with treatment.          Cherri Billings PA-C   Advanced Practice Provider  [facility]    Please note: Portions of this chart may have been created with Dragon voice recognition software. Occasional wrong-word or \"sound-like\" substitutions may have occurred due to inherent limitations of the voice recognition software. Please excuse any typographical or grammatical errors contained herein. Please read the chart carefully and recognize, using context, where the substitutions have occurred.   "

## 2024-12-16 ENCOUNTER — OFFICE VISIT (OUTPATIENT)
Dept: URGENT CARE | Facility: CLINIC | Age: 29
End: 2024-12-16
Payer: COMMERCIAL

## 2024-12-16 VITALS
DIASTOLIC BLOOD PRESSURE: 89 MMHG | BODY MASS INDEX: 33.8 KG/M2 | HEIGHT: 73 IN | RESPIRATION RATE: 16 BRPM | SYSTOLIC BLOOD PRESSURE: 131 MMHG | HEART RATE: 78 BPM | TEMPERATURE: 98.6 F | OXYGEN SATURATION: 97 % | WEIGHT: 255 LBS

## 2024-12-16 DIAGNOSIS — J01.90 ACUTE BACTERIAL RHINOSINUSITIS: Primary | ICD-10-CM

## 2024-12-16 DIAGNOSIS — B96.89 ACUTE BACTERIAL RHINOSINUSITIS: Primary | ICD-10-CM

## 2024-12-16 PROCEDURE — 99212 OFFICE O/P EST SF 10 MIN: CPT | Performed by: PHYSICIAN ASSISTANT

## 2024-12-16 RX ORDER — DOXYCYCLINE 100 MG/1
100 CAPSULE ORAL 2 TIMES DAILY
Qty: 20 CAPSULE | Refills: 0 | Status: SHIPPED | OUTPATIENT
Start: 2024-12-16 | End: 2024-12-26

## 2024-12-16 NOTE — PROGRESS NOTES
[facility]   AMAN NOTE:      Name: Aldair Case, 29 y.o.    CSN:[id number]   MRN:[id number]    PCP: Rogers Ernandez MD    ALL:    Allergies   Allergen Reactions    Cefaclor Unknown    Penicillin Unknown       History:    Chief Complaint: URI    Encounter Date: 12/16/2024      HPI: The history was obtained from the patient. Aldair is a 29 y.o. male, who presents with a chief complaint of URI ongoing for 1 week, has had moderate brownish/yellow nasal discharge & facial pain.     PMHx:    Past Medical History:   Diagnosis Date    Eczema               Current Outpatient Medications   Medication Sig Dispense Refill    doxycycline (Monodox) 100 mg capsule Take 1 capsule (100 mg) by mouth 2 times a day for 10 days. Take with at least 8 ounces (large glass) of water, do not lie down for 30 minutes after 20 capsule 0    fluticasone (Flonase) 50 mcg/actuation nasal spray Administer 2 sprays into each nostril once daily. Shake gently. Before first use, prime pump. After use, clean tip and replace cap. 16 g 11    loratadine (Claritin) 10 mg tablet Take 1 tablet (10 mg) by mouth once daily. 30 tablet 11    ofloxacin (Ocuflox) 0.3 % ophthalmic solution Instill 1 to 2 drops in affected eye(s) every 4 hours for the first 2 days, then instill 1 to 2 drops 4 times daily for an additional 5 days. (Patient not taking: Reported on 12/16/2024) 5 mL 0     No current facility-administered medications for this visit.         PMSx:    Past Surgical History:   Procedure Laterality Date    FRACTURE SURGERY      OTHER SURGICAL HISTORY  02/01/2022    Arm surgery    UMBILICAL HERNIA REPAIR  03/27/2024    Open primary umbilical hernia repair by Dr. Arturo Byrne Hx:   Family History   Problem Relation Name Age of Onset    Hypothyroidism Mother      Diabetes type II Father Lance     Hypertension Father Lance     Diabetes Father Lance     Hernia Father Lance     Crohn's disease Brother Boogie     Heart disease  Mother's Brother Cully     Stroke Mother's Brother Cully     Heart disease Mother's Brother Leonid     Heart disease Mother's Brother Beny     Hypertension Father's Brother Dieudonne     Hernia Father's Brother Dieudonne     Asthma Paternal Grandfather Antwon     Cancer Paternal Grandfather Antwon     Colon cancer Paternal Grandfather Antwon     Clotting disorder Paternal Grandfather Antwon        SOC. Hx:     Social History     Socioeconomic History    Marital status:      Spouse name: Talisha    Number of children: Not on file    Years of education: Not on file    Highest education level: Not on file   Occupational History    Not on file   Tobacco Use    Smoking status: Never     Passive exposure: Never    Smokeless tobacco: Never   Vaping Use    Vaping status: Never Used   Substance and Sexual Activity    Alcohol use: Never    Drug use: Never    Sexual activity: Yes     Partners: Female   Other Topics Concern    Not on file   Social History Narrative    Not on file     Social Drivers of Health     Financial Resource Strain: Not on file   Food Insecurity: Not on file   Transportation Needs: Not on file   Physical Activity: Not on file   Stress: Not on file   Social Connections: Not on file   Intimate Partner Violence: Not on file   Housing Stability: Not on file         Vitals:    12/16/24 1226   BP: 131/89   Pulse: 78   Resp: 16   Temp: 37 °C (98.6 °F)   SpO2: 97%     116 kg (255 lb)          Physical Exam  Vitals reviewed.   Constitutional:       Appearance: Normal appearance. He is normal weight.   HENT:      Head: Normocephalic and atraumatic.      Nose: Congestion present.      Right Turbinates: Enlarged and swollen.      Left Turbinates: Enlarged and swollen.      Right Sinus: Frontal sinus tenderness present.      Mouth/Throat:      Mouth: Mucous membranes are moist.   Eyes:      Extraocular Movements: Extraocular movements intact.   Cardiovascular:      Rate and Rhythm: Normal rate and regular rhythm.   Pulmonary:      Effort:  Pulmonary effort is normal.      Breath sounds: Normal breath sounds. No decreased breath sounds, wheezing or rhonchi.   Abdominal:      General: Abdomen is flat.   Musculoskeletal:         General: Normal range of motion.      Cervical back: Normal range of motion and neck supple.   Skin:     General: Skin is warm.      Capillary Refill: Capillary refill takes less than 2 seconds.   Neurological:      Mental Status: He is alert and oriented to person, place, and time.   Psychiatric:         Behavior: Behavior normal.             ____________________________________________________________________    I did personally review Aldair's past medical history, surgical history, social history, as well as family history (when relevant).  In this case, I also oversaw the his drug management by reviewing his medication list, allergy list, as well as the medications that I prescribed during the UC course and/or recommended as an out-patient (including possible OTC medications such as acetaminophen, NSAIDs , etc).    After reviewing the items above, I did look at previous medical documentation, such as recent hospitalizations, office visits, and/or recent consultations with PCP/specialist.                          SDOH:   Another factor that I considered in Aldair's care was his Social Determinants of Health (SDOH). During this UC encounter, he did not have social determinants of health. Those SDOH influencing Aldair's care are: none      _____________________________________________________________________      UC COURSE/MEDICAL DECISION MAKING:    Aldair is a 29 y.o., who presents with a working diagnosis of   1. Acute bacterial rhinosinusitis     with a differential to include: Influenza, parainfluenza, rhinovirus, adenovirus, metapneumovirus, coronavirus, COVID-19, postnasal drip, strep pharyngitis, GERD, retropharyngeal abscess, tonsillitis, adenitis, seasonal allergies    Current plan given allergies to treat with doxycycline  and encouraged use of Flonase, discussed when to seek reevaluation, encouraged moist purified air and proper hydration, he was agreeable and discharged.        Dg Morin PA-C   Advanced Practice Provider  [facility]    Please note: While the patient may or may not have received printed discharge paperwork, all relevant medical findings, test results, and treatment details are accessible through the electronic medical record system. The patient is encouraged to review their chart via the patient portal for comprehensive information and follow-up instructions.

## (undated) DEVICE — NEEDLE, ECLIPSE, 25GA X 1-1/2 IN

## (undated) DEVICE — DRAPE, SURGICAL, LAP CHOLY, 76 X 120

## (undated) DEVICE — GLOVE, PROTEXIS PI CLASSIC, SZ-6.5, PF, LF

## (undated) DEVICE — SOLUTION, IRRIGATION, SODIUM CHLORIDE 0.9%, 1000 ML, POUR BOTTLE

## (undated) DEVICE — GLOVE, SURGICAL, PROTEXIS PI BLUE W/NEUTHERA, 6.5, PF, LF

## (undated) DEVICE — SUTURE, VICRYL, 3-0, 27 IN, SH

## (undated) DEVICE — APPLICATOR, CHLORAPREP, W/ORANGE TINT, 26ML

## (undated) DEVICE — SYRINGE, 10 ML, 21 G X 1 0.5 IN, LUER LOK

## (undated) DEVICE — Device

## (undated) DEVICE — BALL, COTTON, MEDIUM, STERILE

## (undated) DEVICE — SUTURE, VICRYL, 4-0, 18 IN, UNDYED BR PS-2

## (undated) DEVICE — STRIP, SKIN CLOSURE, STERI STRIP, REINFORCED, 0.5 X 4 IN

## (undated) DEVICE — MASK, FACE, ANESTHESIA, ADULT, LARGE, P6, RED

## (undated) DEVICE — SUTURE, PROLENE, 0, 30 IN, SH

## (undated) DEVICE — STRAP, KNEE AND BODY, SINGLE USE

## (undated) DEVICE — DRESSING, TRANSPARENT, TEGADERM, 4 X 4-3/4 IN, NO LABEL

## (undated) DEVICE — DRESSING, GAUZE, SPONGE, 12 PLY, CURITY, 4 X 4 IN, STERILE

## (undated) DEVICE — CIRCUIT, BREATHING, ADULT, B/V FILTER, HMEF, 3 L BAG, 108 IN

## (undated) DEVICE — STRAP, ARMBOARD, 3IN, BLUE/WHITE, SECURE HOOK